# Patient Record
Sex: MALE | Race: WHITE | NOT HISPANIC OR LATINO | RURAL
[De-identification: names, ages, dates, MRNs, and addresses within clinical notes are randomized per-mention and may not be internally consistent; named-entity substitution may affect disease eponyms.]

---

## 2023-09-07 ENCOUNTER — TELEPHONE (OUTPATIENT)
Dept: FAMILY MEDICINE CLINIC | Facility: CLINIC | Age: 23
End: 2023-09-07

## 2023-09-07 NOTE — TELEPHONE ENCOUNTER
Had a message from pt with very similar name so accidentally started to put in phone message, but saw  and realized it was incorrect, can you please delete this from my task box?